# Patient Record
Sex: MALE | Race: WHITE | NOT HISPANIC OR LATINO | URBAN - METROPOLITAN AREA
[De-identification: names, ages, dates, MRNs, and addresses within clinical notes are randomized per-mention and may not be internally consistent; named-entity substitution may affect disease eponyms.]

---

## 2017-06-08 ENCOUNTER — OFFICE VISIT - HEALTHEAST (OUTPATIENT)
Dept: INTERNAL MEDICINE | Facility: CLINIC | Age: 67
End: 2017-06-08

## 2017-06-08 DIAGNOSIS — Z00.00 ROUTINE GENERAL MEDICAL EXAMINATION AT A HEALTH CARE FACILITY: ICD-10-CM

## 2017-06-08 DIAGNOSIS — R42 VERTIGO: ICD-10-CM

## 2017-06-08 DIAGNOSIS — I73.00 RAYNAUD'S PHENOMENON WITHOUT GANGRENE: ICD-10-CM

## 2017-06-08 DIAGNOSIS — M25.561 CHRONIC PAIN OF RIGHT KNEE: ICD-10-CM

## 2017-06-08 DIAGNOSIS — Z13.1 ENCOUNTER FOR SCREENING FOR DIABETES MELLITUS: ICD-10-CM

## 2017-06-08 DIAGNOSIS — Z00.00 HEALTHCARE MAINTENANCE: ICD-10-CM

## 2017-06-08 DIAGNOSIS — S46.001A INJURY OF MUSCLE OR TENDON OF ROTATOR CUFF, RIGHT, INITIAL ENCOUNTER: ICD-10-CM

## 2017-06-08 DIAGNOSIS — N52.9 ERECTILE DYSFUNCTION, UNSPECIFIED ERECTILE DYSFUNCTION TYPE: ICD-10-CM

## 2017-06-08 DIAGNOSIS — L57.0 ACTINIC KERATOSES: ICD-10-CM

## 2017-06-08 DIAGNOSIS — Z13.220 ENCOUNTER FOR SCREENING FOR LIPOID DISORDERS: ICD-10-CM

## 2017-06-08 DIAGNOSIS — G89.29 CHRONIC PAIN OF RIGHT KNEE: ICD-10-CM

## 2017-06-08 LAB
CHOLEST SERPL-MCNC: 164 MG/DL
FASTING STATUS PATIENT QL REPORTED: YES
HDLC SERPL-MCNC: 74 MG/DL
LDLC SERPL CALC-MCNC: 83 MG/DL
TRIGL SERPL-MCNC: 37 MG/DL

## 2017-06-08 ASSESSMENT — MIFFLIN-ST. JEOR: SCORE: 1501.08

## 2017-06-09 ENCOUNTER — OFFICE VISIT - HEALTHEAST (OUTPATIENT)
Dept: PHYSICAL THERAPY | Facility: REHABILITATION | Age: 67
End: 2017-06-09

## 2017-06-09 DIAGNOSIS — M75.81 RIGHT ROTATOR CUFF TENDONITIS: ICD-10-CM

## 2017-06-09 DIAGNOSIS — M62.81 GENERALIZED MUSCLE WEAKNESS: ICD-10-CM

## 2017-06-12 ENCOUNTER — RECORDS - HEALTHEAST (OUTPATIENT)
Dept: ADMINISTRATIVE | Facility: OTHER | Age: 67
End: 2017-06-12

## 2017-06-15 ENCOUNTER — OFFICE VISIT - HEALTHEAST (OUTPATIENT)
Dept: PHYSICAL THERAPY | Facility: REHABILITATION | Age: 67
End: 2017-06-15

## 2017-06-15 DIAGNOSIS — M62.81 GENERALIZED MUSCLE WEAKNESS: ICD-10-CM

## 2017-06-15 DIAGNOSIS — M75.81 RIGHT ROTATOR CUFF TENDONITIS: ICD-10-CM

## 2017-06-29 ENCOUNTER — OFFICE VISIT - HEALTHEAST (OUTPATIENT)
Dept: PHYSICAL THERAPY | Facility: REHABILITATION | Age: 67
End: 2017-06-29

## 2017-06-29 DIAGNOSIS — M62.81 GENERALIZED MUSCLE WEAKNESS: ICD-10-CM

## 2017-06-29 DIAGNOSIS — M75.81 RIGHT ROTATOR CUFF TENDONITIS: ICD-10-CM

## 2017-07-20 ENCOUNTER — OFFICE VISIT - HEALTHEAST (OUTPATIENT)
Dept: PHYSICAL THERAPY | Facility: REHABILITATION | Age: 67
End: 2017-07-20

## 2017-07-20 DIAGNOSIS — M62.81 GENERALIZED MUSCLE WEAKNESS: ICD-10-CM

## 2017-07-20 DIAGNOSIS — M75.81 RIGHT ROTATOR CUFF TENDONITIS: ICD-10-CM

## 2017-07-27 ENCOUNTER — OFFICE VISIT - HEALTHEAST (OUTPATIENT)
Dept: PHYSICAL THERAPY | Facility: REHABILITATION | Age: 67
End: 2017-07-27

## 2017-07-27 DIAGNOSIS — M62.81 GENERALIZED MUSCLE WEAKNESS: ICD-10-CM

## 2017-07-27 DIAGNOSIS — M75.81 RIGHT ROTATOR CUFF TENDONITIS: ICD-10-CM

## 2017-09-06 ENCOUNTER — COMMUNICATION - HEALTHEAST (OUTPATIENT)
Dept: INTERNAL MEDICINE | Facility: CLINIC | Age: 67
End: 2017-09-06

## 2017-11-08 ENCOUNTER — AMBULATORY - HEALTHEAST (OUTPATIENT)
Dept: NURSING | Facility: CLINIC | Age: 67
End: 2017-11-08

## 2017-11-08 DIAGNOSIS — Z00.00 HEALTHCARE MAINTENANCE: ICD-10-CM

## 2018-06-28 ENCOUNTER — OFFICE VISIT - HEALTHEAST (OUTPATIENT)
Dept: INTERNAL MEDICINE | Facility: CLINIC | Age: 68
End: 2018-06-28

## 2018-06-28 DIAGNOSIS — Z13.220 ENCOUNTER FOR SCREENING FOR LIPOID DISORDERS: ICD-10-CM

## 2018-06-28 DIAGNOSIS — Z00.00 ROUTINE GENERAL MEDICAL EXAMINATION AT A HEALTH CARE FACILITY: ICD-10-CM

## 2018-06-28 DIAGNOSIS — Z12.11 SCREEN FOR COLON CANCER: ICD-10-CM

## 2018-06-28 DIAGNOSIS — Z12.5 SCREENING FOR MALIGNANT NEOPLASM OF PROSTATE: ICD-10-CM

## 2018-06-28 DIAGNOSIS — Z13.1 ENCOUNTER FOR SCREENING FOR DIABETES MELLITUS: ICD-10-CM

## 2018-06-28 DIAGNOSIS — N52.01 ERECTILE DYSFUNCTION DUE TO ARTERIAL INSUFFICIENCY: ICD-10-CM

## 2018-06-28 LAB
CHOLEST SERPL-MCNC: 157 MG/DL
FASTING STATUS PATIENT QL REPORTED: YES
FASTING STATUS PATIENT QL REPORTED: YES
GLUCOSE BLD-MCNC: 98 MG/DL (ref 70–99)
HDLC SERPL-MCNC: 71 MG/DL
LDLC SERPL CALC-MCNC: 78 MG/DL
PSA SERPL-MCNC: 0.7 NG/ML (ref 0–4.5)
TRIGL SERPL-MCNC: 38 MG/DL

## 2018-06-28 ASSESSMENT — MIFFLIN-ST. JEOR: SCORE: 1498.36

## 2018-06-29 ENCOUNTER — COMMUNICATION - HEALTHEAST (OUTPATIENT)
Dept: INTERNAL MEDICINE | Facility: CLINIC | Age: 68
End: 2018-06-29

## 2018-07-27 ENCOUNTER — RECORDS - HEALTHEAST (OUTPATIENT)
Dept: ADMINISTRATIVE | Facility: OTHER | Age: 68
End: 2018-07-27

## 2018-08-09 ENCOUNTER — RECORDS - HEALTHEAST (OUTPATIENT)
Dept: ADMINISTRATIVE | Facility: OTHER | Age: 68
End: 2018-08-09

## 2018-12-09 ENCOUNTER — COMMUNICATION - HEALTHEAST (OUTPATIENT)
Dept: INTERNAL MEDICINE | Facility: CLINIC | Age: 68
End: 2018-12-09

## 2019-08-15 ENCOUNTER — OFFICE VISIT - HEALTHEAST (OUTPATIENT)
Dept: INTERNAL MEDICINE | Facility: CLINIC | Age: 69
End: 2019-08-15

## 2019-08-15 DIAGNOSIS — Z12.5 SCREENING FOR MALIGNANT NEOPLASM OF PROSTATE: ICD-10-CM

## 2019-08-15 DIAGNOSIS — Z00.00 ROUTINE GENERAL MEDICAL EXAMINATION AT A HEALTH CARE FACILITY: ICD-10-CM

## 2019-08-15 DIAGNOSIS — Z13.220 ENCOUNTER FOR SCREENING FOR LIPOID DISORDERS: ICD-10-CM

## 2019-08-15 DIAGNOSIS — L57.0 AK (ACTINIC KERATOSIS): ICD-10-CM

## 2019-08-15 DIAGNOSIS — B35.1 ONYCHOMYCOSIS: ICD-10-CM

## 2019-08-15 DIAGNOSIS — Z79.899 MEDICATION MANAGEMENT: ICD-10-CM

## 2019-08-15 LAB
ALBUMIN SERPL-MCNC: 4.2 G/DL (ref 3.5–5)
ALP SERPL-CCNC: 55 U/L (ref 45–120)
ALT SERPL W P-5'-P-CCNC: 18 U/L (ref 0–45)
ANION GAP SERPL CALCULATED.3IONS-SCNC: 9 MMOL/L (ref 5–18)
AST SERPL W P-5'-P-CCNC: 21 U/L (ref 0–40)
BILIRUB SERPL-MCNC: 1.3 MG/DL (ref 0–1)
BUN SERPL-MCNC: 13 MG/DL (ref 8–22)
CALCIUM SERPL-MCNC: 9.8 MG/DL (ref 8.5–10.5)
CHLORIDE BLD-SCNC: 107 MMOL/L (ref 98–107)
CHOLEST SERPL-MCNC: 176 MG/DL
CO2 SERPL-SCNC: 25 MMOL/L (ref 22–31)
CREAT SERPL-MCNC: 0.79 MG/DL (ref 0.7–1.3)
ERYTHROCYTE [DISTWIDTH] IN BLOOD BY AUTOMATED COUNT: 11 % (ref 11–14.5)
FASTING STATUS PATIENT QL REPORTED: YES
GFR SERPL CREATININE-BSD FRML MDRD: >60 ML/MIN/1.73M2
GLUCOSE BLD-MCNC: 89 MG/DL (ref 70–125)
HCT VFR BLD AUTO: 45 % (ref 40–54)
HDLC SERPL-MCNC: 79 MG/DL
HGB BLD-MCNC: 15.3 G/DL (ref 14–18)
LDLC SERPL CALC-MCNC: 89 MG/DL
MCH RBC QN AUTO: 32.9 PG (ref 27–34)
MCHC RBC AUTO-ENTMCNC: 34 G/DL (ref 32–36)
MCV RBC AUTO: 97 FL (ref 80–100)
PLATELET # BLD AUTO: 192 THOU/UL (ref 140–440)
PMV BLD AUTO: 7.3 FL (ref 7–10)
POTASSIUM BLD-SCNC: 3.9 MMOL/L (ref 3.5–5)
PROT SERPL-MCNC: 6.6 G/DL (ref 6–8)
PSA SERPL-MCNC: 0.7 NG/ML (ref 0–4.5)
RBC # BLD AUTO: 4.66 MILL/UL (ref 4.4–6.2)
SODIUM SERPL-SCNC: 141 MMOL/L (ref 136–145)
TRIGL SERPL-MCNC: 41 MG/DL
WBC: 4.3 THOU/UL (ref 4–11)

## 2019-08-15 ASSESSMENT — MIFFLIN-ST. JEOR: SCORE: 1501.08

## 2019-08-16 ENCOUNTER — COMMUNICATION - HEALTHEAST (OUTPATIENT)
Dept: INTERNAL MEDICINE | Facility: CLINIC | Age: 69
End: 2019-08-16

## 2019-09-05 ENCOUNTER — RECORDS - HEALTHEAST (OUTPATIENT)
Dept: ADMINISTRATIVE | Facility: OTHER | Age: 69
End: 2019-09-05

## 2019-11-15 ENCOUNTER — AMBULATORY - HEALTHEAST (OUTPATIENT)
Dept: NURSING | Facility: CLINIC | Age: 69
End: 2019-11-15

## 2019-11-15 DIAGNOSIS — Z23 FLU VACCINE NEED: ICD-10-CM

## 2020-01-07 ENCOUNTER — RECORDS - HEALTHEAST (OUTPATIENT)
Dept: ADMINISTRATIVE | Facility: OTHER | Age: 70
End: 2020-01-07

## 2020-03-12 ENCOUNTER — COMMUNICATION - HEALTHEAST (OUTPATIENT)
Dept: INTERNAL MEDICINE | Facility: CLINIC | Age: 70
End: 2020-03-12

## 2020-03-12 DIAGNOSIS — N52.01 ERECTILE DYSFUNCTION DUE TO ARTERIAL INSUFFICIENCY: ICD-10-CM

## 2020-09-02 ENCOUNTER — OFFICE VISIT - HEALTHEAST (OUTPATIENT)
Dept: INTERNAL MEDICINE | Facility: CLINIC | Age: 70
End: 2020-09-02

## 2020-09-02 DIAGNOSIS — N52.01 ERECTILE DYSFUNCTION DUE TO ARTERIAL INSUFFICIENCY: ICD-10-CM

## 2020-09-02 DIAGNOSIS — R03.0 ELEVATED BLOOD PRESSURE READING WITHOUT DIAGNOSIS OF HYPERTENSION: ICD-10-CM

## 2020-09-02 DIAGNOSIS — G89.29 CHRONIC BILATERAL LOW BACK PAIN WITH LEFT-SIDED SCIATICA: ICD-10-CM

## 2020-09-02 DIAGNOSIS — Z00.00 ROUTINE GENERAL MEDICAL EXAMINATION AT A HEALTH CARE FACILITY: ICD-10-CM

## 2020-09-02 DIAGNOSIS — Z23 NEED FOR PROPHYLACTIC VACCINATION AND INOCULATION AGAINST INFLUENZA: ICD-10-CM

## 2020-09-02 DIAGNOSIS — R35.1 NOCTURIA: ICD-10-CM

## 2020-09-02 DIAGNOSIS — Z12.5 PROSTATE CANCER SCREENING: ICD-10-CM

## 2020-09-02 DIAGNOSIS — M54.42 CHRONIC BILATERAL LOW BACK PAIN WITH LEFT-SIDED SCIATICA: ICD-10-CM

## 2020-09-02 LAB
ALBUMIN SERPL-MCNC: 4.1 G/DL (ref 3.5–5)
ALP SERPL-CCNC: 47 U/L (ref 45–120)
ALT SERPL W P-5'-P-CCNC: 18 U/L (ref 0–45)
AST SERPL W P-5'-P-CCNC: 21 U/L (ref 0–40)
BILIRUB DIRECT SERPL-MCNC: 0.5 MG/DL
BILIRUB SERPL-MCNC: 1.3 MG/DL (ref 0–1)
PROT SERPL-MCNC: 6.4 G/DL (ref 6–8)
PSA SERPL-MCNC: 0.8 NG/ML (ref 0–4.5)

## 2020-09-02 RX ORDER — IBUPROFEN 200 MG
200 TABLET ORAL EVERY 6 HOURS PRN
Status: SHIPPED | COMMUNITY
Start: 2020-09-02

## 2020-09-02 RX ORDER — SILDENAFIL 100 MG/1
100 TABLET, FILM COATED ORAL DAILY PRN
Qty: 5 TABLET | Refills: 4 | Status: SHIPPED | OUTPATIENT
Start: 2020-09-02

## 2020-09-02 ASSESSMENT — MIFFLIN-ST. JEOR: SCORE: 1482.93

## 2020-09-03 ENCOUNTER — COMMUNICATION - HEALTHEAST (OUTPATIENT)
Dept: INTERNAL MEDICINE | Facility: CLINIC | Age: 70
End: 2020-09-03

## 2020-10-05 ENCOUNTER — RECORDS - HEALTHEAST (OUTPATIENT)
Dept: ADMINISTRATIVE | Facility: OTHER | Age: 70
End: 2020-10-05

## 2021-05-31 VITALS — WEIGHT: 162 LBS | HEIGHT: 70 IN | BODY MASS INDEX: 23.19 KG/M2

## 2021-05-31 NOTE — PROGRESS NOTES
Assessment and Plan:       1. Routine general medical examination at a health care facility  Overall, Moises is very healthy.  I recommended incorporating resistance training to his exercise regimen.  He does some floor exercises every day.  He has had a healthy weight, but he wants to lose the waistline.  I talked to him about right lateral epicondylitis and right olecranon pain, both improving, both can be safely observed.    2. Encounter for screening for lipoid disorders  Check fasting blood work  - Lipid Cascade, FASTING    3. Screening for malignant neoplasm of prostate  Check annual blood screen for prostate cancer  - PSA (Prostatic-Specific Antigen), Annual Screen    4. Medication management  He is interested in using Lamisil for onychomycosis of the right great toe, we should get a baseline hemogram and CMP.  - HM2(CBC w/o Differential)  - Comprehensive Metabolic Panel    5. Onychomycosis  Plan for 12-week treatment.  I did tell him that clipping the nail and sending into the lab is standard care, but he is comfortable with just trying the medication first.  - terbinafine HCl (LAMISIL) 250 mg tablet; Take 1 tablet (250 mg total) by mouth daily.  Dispense: 84 tablet; Refill: 0    6. AK (actinic keratosis)  He has follow-up with his dermatologist.  He has one red pimple-like lesion on the tip of his nose.  Otherwise, no actinic keratoses found.  He has a history of using 5-FU on the forehead.        The patient's current medical problems were reviewed.    The following health maintenance schedule was reviewed with the patient and provided in printed form in the after visit summary:   Health Maintenance   Topic Date Due     HEPATITIS C SCREENING  1950     MEDICARE ANNUAL WELLNESS VISIT  06/28/2019     INFLUENZA VACCINE RULE BASED (1) 08/01/2019     FALL RISK ASSESSMENT  08/15/2020     ADVANCE CARE PLANNING  06/28/2023     TD 18+ HE  09/05/2027     COLONOSCOPY  08/09/2028     PNEUMOCOCCAL POLYSACCHARIDE  VACCINE AGE 65 AND OVER  Completed     PNEUMOCOCCAL CONJUGATE VACCINE FOR ADULTS (PCV13 OR PREVNAR)  Completed     ZOSTER VACCINES  Completed        Subjective:   Chief Complaint: Moises Quintana is an 68 y.o. male here for an Annual Wellness visit.   HPI:      He has a mild right elbow issue when using a drill/screwdriver.  It happened 2 months ago, usually bothered when arm extended and squeezes his fist.      The left elbow when pushed down on the matress caused a sharp pain at the corner of the elbow.  It is still occaisonally tender with pressure applied to the area.  No pain or numbness down the arm.      He has some calluses on the left foot.      He follows with the dermatologist.     He had a colonoscopy last year-- good for 10 years.      Review of Systems:    Please see above.  The rest of the review of systems are negative for all systems.    Patient Care Team:  Mir Fernandez DO as PCP - General (Internal Medicine)  Ze Parsons MD as Physician (Dermatology)     Patient Active Problem List   Diagnosis     ED (erectile dysfunction)     Chronic pain of right knee     Raynaud's phenomenon without gangrene     AK (actinic keratosis)     No past medical history on file.   No past surgical history on file.   Family History   Problem Relation Age of Onset     Dementia Mother      Diabetes Mother      Heart disease Father      Cancer Brother         Pancreatic      Social History     Socioeconomic History     Marital status:      Spouse name: Not on file     Number of children: Not on file     Years of education: Not on file     Highest education level: Not on file   Occupational History     Not on file   Social Needs     Financial resource strain: Not on file     Food insecurity:     Worry: Not on file     Inability: Not on file     Transportation needs:     Medical: Not on file     Non-medical: Not on file   Tobacco Use     Smoking status: Never Smoker     Smokeless tobacco: Never Used  "  Substance and Sexual Activity     Alcohol use: Yes     Alcohol/week: 4.0 oz     Types: 8 Standard drinks or equivalent per week     Comment: Ellis, wine, beer     Drug use: Not on file     Sexual activity: Not on file   Lifestyle     Physical activity:     Days per week: Not on file     Minutes per session: Not on file     Stress: Not on file   Relationships     Social connections:     Talks on phone: Not on file     Gets together: Not on file     Attends Judaism service: Not on file     Active member of club or organization: Not on file     Attends meetings of clubs or organizations: Not on file     Relationship status: Not on file     Intimate partner violence:     Fear of current or ex partner: Not on file     Emotionally abused: Not on file     Physically abused: Not on file     Forced sexual activity: Not on file   Other Topics Concern     Not on file   Social History Narrative     Not on file      Current Outpatient Medications   Medication Sig Dispense Refill     sildenafil (VIAGRA) 100 MG tablet Take 1 tablet (100 mg total) by mouth daily as needed for erectile dysfunction. 15 tablet 5     terbinafine HCl (LAMISIL) 250 mg tablet Take 1 tablet (250 mg total) by mouth daily. 84 tablet 0     No current facility-administered medications for this visit.       Objective:   Vital Signs:   Visit Vitals  /64 (Patient Site: Left Arm, Patient Position: Sitting, Cuff Size: Adult Regular)   Pulse 60   Ht 5' 10\" (1.778 m)   Wt 162 lb (73.5 kg)   SpO2 98%   BMI 23.24 kg/m         VisionScreening:  No exam data present     PHYSICAL EXAM    General: alert, no distress  HEENT: sclerae anicteric, moist oral mucosa  Heart: Regular rate and rhythm, no murmurs.  No pretibial edema.  Warm extremities  Lungs: Clear to auscultation bilat  Gastrointestinal: abdomen is soft, non-tender, non-distended.    Skin: warm/dry, no rashes, red papule on the right side of the tip of the nose  Neuro: no gross " abnormalities  Onychomycosis: great toe R      Assessment Results 8/15/2019   Activities of Daily Living No help needed   Instrumental Activities of Daily Living No help needed   Get Up and Go Score Less than 12 seconds   Mini Cog Total Score 5   Some recent data might be hidden     A Mini-Cog score of 0-2 suggests the possibility of dementia, score of 3-5 suggests no dementia    Identified Health Risks:     Patient's advanced directive was discussed and I am comfortable with the patient's wishes.

## 2021-05-31 NOTE — PATIENT INSTRUCTIONS - HE
Advance Directive  Patient s advance directive was discussed and I am comfortable with the patient s wishes.  Patient Education   Personalized Prevention Plan  You are due for the preventive services outlined below.  Your care team is available to assist you in scheduling these services.  If you have already completed any of these items, please share that information with your care team to update in your medical record.  Health Maintenance   Topic Date Due     HEPATITIS C SCREENING  1950     MEDICARE ANNUAL WELLNESS VISIT  06/28/2019     INFLUENZA VACCINE RULE BASED (1) 08/01/2019     FALL RISK ASSESSMENT  08/15/2020     ADVANCE CARE PLANNING  06/28/2023     TD 18+ HE  09/05/2027     COLONOSCOPY  08/09/2028     PNEUMOCOCCAL POLYSACCHARIDE VACCINE AGE 65 AND OVER  Completed     PNEUMOCOCCAL CONJUGATE VACCINE FOR ADULTS (PCV13 OR PREVNAR)  Completed     ZOSTER VACCINES  Completed

## 2021-06-01 VITALS — BODY MASS INDEX: 23.11 KG/M2 | WEIGHT: 161.4 LBS | HEIGHT: 70 IN

## 2021-06-03 VITALS — WEIGHT: 162 LBS | HEIGHT: 70 IN | BODY MASS INDEX: 23.19 KG/M2

## 2021-06-04 VITALS
HEIGHT: 70 IN | HEART RATE: 61 BPM | OXYGEN SATURATION: 98 % | DIASTOLIC BLOOD PRESSURE: 71 MMHG | BODY MASS INDEX: 22.62 KG/M2 | WEIGHT: 158 LBS | SYSTOLIC BLOOD PRESSURE: 138 MMHG

## 2021-06-06 NOTE — TELEPHONE ENCOUNTER
Refill Approved    Rx renewed per Medication Renewal Policy. Medication was last renewed on 6/28/18.    Eleanor Schaefer, [facility] Triage/Med Refill 3/15/2020     Requested Prescriptions   Pending Prescriptions Disp Refills     sildenafiL (VIAGRA) 100 MG tablet [Pharmacy Med Name: SILDENAFIL 100 MG TABLET] 5 tablet 17     Sig: TAKE 1 TABLET (100 MG TOTAL) BY MOUTH DAILY AS NEEDED FOR ERECTILE DYSFUNCTION.       Medications for Impotence Refill Protocol Passed - 3/12/2020  5:47 PM        Passed - PCP or prescribing provider visit in last year     Last office visit with prescriber/PCP: Visit date not found OR same dept: Visit date not found OR same specialty: Visit date not found  Last physical: 8/15/2019 Last MTM visit: Visit date not found   Next visit within 3 mo: Visit date not found  Next physical within 3 mo: Visit date not found  Prescriber OR PCP: Mir Fernandez DO  Last diagnosis associated with med order: 1. Erectile dysfunction due to arterial insufficiency  - sildenafiL (VIAGRA) 100 MG tablet [Pharmacy Med Name: SILDENAFIL 100 MG TABLET]; Take 1 tablet (100 mg total) by mouth daily as needed for erectile dysfunction.  Dispense: 5 tablet; Refill: 17    If protocol passes may refill for 12 months if within 3 months of last provider visit (or a total of 15 months).

## 2021-06-11 NOTE — PROGRESS NOTES
Assessment and Plan:       1. Healthcare maintenance  Moises Quintana is a 66-year-old man who presents for initial annual wellness visit.  We will update fasting labs namely glucose and cholesterol.  Administer PCV vaccination.  Recently retired, should continue exercise as he is.  Overall, he is very healthy.  - Pneumococcal polysaccharide vaccine 23-valent 1 yo or older, subq/IM  - Colonoscopy due next year    2. Routine general medical examination at a health care facility  See above    3. Encounter for screening for diabetes mellitus   Glucose; Future  - Glucose    4. Encounter for screening for lipoid disorders    - Lipid Grover, FASTING; Future  - Lipid Grover, FASTING    5. Actinic keratoses  Previously had actinic keratosis treated on the nose, has multiple lesions on the forehead, what appears to be seborrheic keratosis on the left forearm, but is somewhat dark, I recommended dermatology referral, because I think the cryotherapy would be fairly extensive, and he should have a full skin exam.  - Ambulatory referral to Dermatology    6. Injury of muscle or tendon of rotator cuff, right, initial encounter  He describes trouble throwing a baseball, exam is fairly benign, but some signs of rotator cuff tendinitis.  I recommended x-ray, physical therapy.  I did advise him that it may be an additional charge.  Noted some degeneration of AC joint and x-ray.  - Ambulatory referral to Physical Therapy  - XR Shoulder Right 2 or More VWS    7. Chronic pain of right knee  Intermittently, he will of a sharp pain in the knee, intermittently has swelling.  Rest arthritis, meniscus injury are on the differential.  He does not want to pursue further evaluation or treatment until it gets worse.    8. 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  I advised him to contact his insurance to see what will be  covered.  The letter he showed me did not indicate which one was on the formulary or would be supported.    9. Raynaud's phenomenon without gangrene  Cold fingers and tingling in the wintertime, as present the diagnosis.  Advised to come back in the winter if that returns, so we can get better history.  He did not does endorse some blanching of the fingers, which makes me think it is this.    10. Vertigo  A few months ago, he would have vertigo first thing in the morning when turning over in bed or sitting up, describing a spinning sensation.  Likely a benign positional vertigo.  Also advised to come back if this returns so we can further work this up.         The patient's current medical problems were reviewed.      The following health maintenance schedule was reviewed with the patient and provided in printed form in the after visit summary:   Health Maintenance   Topic Date Due     ADVANCE DIRECTIVES DISCUSSED WITH PATIENT  11/10/1968     ZOSTER VACCINE  11/10/2010     PNEUMOCOCCAL POLYSACCHARIDE VACCINE AGE 65 AND OVER  11/10/2015     COLONOSCOPY  03/15/2018     INFLUENZA VACCINE RULE BASED (Season Ended) 08/01/2017     FALL RISK ASSESSMENT  06/08/2018     TD 18+ HE  10/22/2020     PNEUMOCOCCAL CONJUGATE VACCINE FOR ADULTS (PCV13 OR PREVNAR)  Completed        Subjective:   Chief Complaint: Moises Quintana is an 66 y.o. male here for an Annual Wellness visit.     HPI:    Moises Quintana is a 66-year-old man who presents for annual wellness visit.  He describes his health as good.  He has retired 1 month ago from [employer], he was a, still there.  He is doing housework, but he is concerned that he does not have enough hobbies to keep him busy in the long run.  He continues to have some pain in his knee intermittently, such as when he was in [place] visiting his daughter, after walking 10 miles, he had sharp pain when starting to climb the stairs.  Occasionally it swells, but goes away quickly.  He uses a brace as  needed when he skis, but has not done this recently.  Since he has stopped working he has noticed a decrease in his bowel movements.    He has noticed some decrease in the range of motion and strength in his right arm when he throws a ball overhead.  He has pain along the course of the upper deltoid/supraspinatus when he does this or reaches back to throw a ball.    He received a notice from [employer] that Cialis is no longer on the formulary.  He wonders what the alternative option is.    He noticed a small skin lesion on his left forearm.    Over the winter for about 1-2 months, he had short-lived vertigo upon standing or rolling over in his bed, but is now resolved.  He would usually have this only in the morning  And each time resolved very quickly.    In the winter he has noticed cold and tingling in his hands, some paleness when he feels mildly colds.  He wonders what this could be.    He is getting exercise 4-5 times per week, walking regularly, doing floor exercises.    Review of Systems:  Please see above.  The rest of the review of systems are negative for all systems.    Patient Care Team:  Mir Fernandez DO as PCP - General (Internal Medicine)     Patient Active Problem List   Diagnosis     ED (erectile dysfunction)     Chronic pain of right knee     Raynaud's phenomenon without gangrene     No past medical history on file.   No past surgical history on file.   Family History   Problem Relation Age of Onset     Dementia Mother      Diabetes Mother      Heart disease Father      Cancer Brother      Pancreatic      Social History     Social History     Marital status:      Spouse name: N/A     Number of children: N/A     Years of education: N/A     Occupational History     Not on file.     Social History Main Topics     Smoking status: Never Smoker     Smokeless tobacco: Not on file     Alcohol use 4.0 oz/week     8 Standard drinks or equivalent per week      Comment: Weber, wine, beer  "    Drug use: Not on file     Sexual activity: Not on file     Other Topics Concern     Not on file     Social History Narrative      Current Outpatient Prescriptions   Medication Sig Dispense Refill     tadalafil (CIALIS) 10 MG tablet Take 1 tablet (10 mg total) by mouth daily as needed for erectile dysfunction. 20 tablet 1     No current facility-administered medications for this visit.       Objective:   Vital Signs:   Visit Vitals     /66     Pulse (!) 52     Ht 5' 10\" (1.778 m)     Wt 162 lb (73.5 kg)     SpO2 98%     BMI 23.24 kg/m2        VisionScreening:   Visual Acuity Screening    Right eye Left eye Both eyes   Without correction:      With correction: 10/10 10/10 10/10        PHYSICAL EXAM    General: alert, no distress  HEENT: sclerae anicteric, moist oral mucosa  Heart: Regular rate and rhythm, no murmurs.  No pretibial edema.  Warm extremities  Lungs: Clear to auscultation bilat  Gastrointestinal: abdomen is soft, non-tender, non-distended.    Skin: warm/dry, no rashes  Neuro: no gross abnormalities  Musculoskeletal: Painful arc, pain with external range of motion and internal range of motion.  Full strength in rotator cuff muscles.  Negative signs of impingement.       Assessment Results 6/8/2017   Activities of Daily Living No help needed   Instrumental Activities of Daily Living No help needed   Get Up and Go Score Less than 12 seconds   Mini Cog Total Score 5     A Mini-Cog score of 0-2 suggests the possibility of dementia, score of 3-5 suggests no dementia    X-ray of the shoulder shows degenerative change of the AC joint, otherwise normal.    Identified Health Risks:     The patient reports that he drinks more than one alcoholic drink per day but denies binge or excessive drinking. He was counseled and given information about possible harmful effects of excessive alcohol intake.  Patient's advanced directive was discussed and I am comfortable with the patient's wishes.        "

## 2021-06-11 NOTE — PROGRESS NOTES
[facility] Daily Progress     Patient Name: Moises Quintana  Date: 6/15/2017  Visit #: 2  PTA visit #:  na  Referral Diagnosis: Injury of muscle or tendon of rotator cuff, right, initial encounter [S46.001A]   Referring provider: Mir Fernandez DO  Visit Diagnosis:     ICD-10-CM    1. Right rotator cuff tendonitis M75.81    2. Generalized muscle weakness M62.81          Assessment:     HEP/POC compliance is  good .  Response to Intervention Min cueing with HEP. Spent time going over pt's own exercises he completes at home per pt request.  Patient is appropriate to continue with skilled physical therapy intervention, as indicated by initial plan of care.    Goal Status: on-going  Pt. will be independent with home exercise program in : 4 weeks (to self-manage pain and improve function)  Pt will: be able to throw a ball 5-10 times for playing with grandkids pain-free in 8 weeks.    Plan / Patient Education:     Continue with initial plan of care.  Progress with home program as tolerated. trial throwing if appropriate    Subjective:     Pain Ratin R shoulder     Exercises are going well. R shoulder has not had any pain but has not thrown a ball at all. Is doing other exercises in the morning and would like to review those too.    Objective:     Min cueing with HEP    Minimal pain with cocking phase of throwing     Treatment Today     TREATMENT MINUTES COMMENTS   Evaluation     Self-care/ Home management     Manual therapy     Neuromuscular Re-education     Therapeutic Activity     Therapeutic Exercises 30 -see exercise flow sheet  -UBE x4 min F/B, WL 3.0  Current HEP: performed 5-10 reps of each:  SLR  Bicycle  rachel leg ext  Hip abd S/L  Hip add S/L  Hip ext prone  Prone ext   Gait training     Modality__________________                Total 30    Blank areas are intentional and mean the treatment did not include these items.       Georgina Rivas, PT, DPT  6/15/2017

## 2021-06-11 NOTE — PROGRESS NOTES
[facility]   Shoulder Initial Evaluation    Patient Name: Moises Suarez  Date of evaluation: 6/9/2017  Referral Diagnosis: Injury of muscle or tendon of rotator cuff, right, initial encounter [S46.001A]   Referring provider: Mir Fernandez DO  Visit Diagnosis:     ICD-10-CM    1. Right rotator cuff tendonitis M75.81    2. Generalized muscle weakness M62.81        Assessment:      Impairments in  pain, posture, joint mobility, strength, motor function  Patient's signs and symptoms are consistent with right rotator cuff tendonitis. Pt presents with pain while overhead throwing in his right lateral shoulder. Today, he has negative R shoulder special tests, impaired seated posture, R glenohumeral posterior capsule tightness and weakness into R shoulder ER and abduction..  The POC is dynamic and will be modified on an ongoing basis.  Barriers to achieving goals as noted in the assessment section may affect outcome.  Prognosis to achieve goals is  good   Skilled PT is required to reduce pain and be able to return to throwing a ball.  Pt. is appropriate for skilled PT intervention as outlined in the Plan of Care (POC).  Pt. is a good candidate for skilled PT services to improve pain levels and function.      Goals:  Pt. will be independent with home exercise program in : 4 weeks (to self-manage pain and improve function)  Pt will: be able to throw a ball 5-10 times for playing with grandkids pain-free in 8 weeks.    Patient's expectations/goals are realistic.    Barriers to Learning or Achieving Goals:  No Barriers.       Plan / Patient Instructions:        Plan of Care:   Communication with: Referral Source  Patient Related Instruction: Nature of Condition;Treatment plan and rationale;Self Care instruction;Basis of treatment;Body mechanics;Expected outcome;Next steps;Precautions;Posture  Times per Week: 1  Number of Weeks: 8  Number of Visits: 8  Therapeutic Exercise:  ROM;Stretching;Strengthening  Neuromuscular Reeducation: kinesio tape;posture;balance/proprioception;core  Manual Therapy: soft tissue mobilization;myofascial release;joint mobilization;muscle energy;strain counterstrain  Equipment: theraband    POC and pathology of condition were reviewed with patient.  Pt. is in agreement with the Plan of Care  A Home Exercise Program (HEP) was initiated today.  Pt. was instructed in exercises by PT and patient was given a handout with detailed instructions.    Plan for next visit: progress RC strengthening, posterior and inferior mobilizations  .   Subjective:       Social information:   Occupation:retired   Work Status:NA   Equipment Available: None    History of Present Illness:    Moises is a 66 y.o. male who presents to therapy today with complaints of right shoulder pain. Date of onset/duration of symptoms is about 8 months (since Oct 2016). Pt denies any injury. Onset was gradual. Symptoms are not improving. He can avoid the pain if he stops throwing.  He denies history of similar symptoms. He describes their previous level of function as not limited. The pain only lasts when he is throwing the ball. Sometimes then it will hurt later on with reaching.    Pain Ratin  Pain rating at best: 0  Pain rating at worst: 4  Pain description: sharp    Functional limitations are described as occurring with:   Throwing a ball           Objective:      Note: Items left blank indicates the item was not performed or not indicated at the time of the evaluation.    Patient Outcome Measures :    Shoulder Pain and Disability Index (SPADI) in %: 4   Scores range from 0-100%, where a score of 0% represents minimal pain and maximal function. The minimal clincically important difference is a score reduction of 10%.    Shoulder Examination  1. Right rotator cuff tendonitis     2. Generalized muscle weakness       Precautions/Restrictions: None  Involved side: Right  Posture Observation:       General sitting posture is  fair. Mild forward shoulders and head  Cervical Clearing: Normal    Shoulder/Elbow ROM  =  Date: 6/9/2017     Shoulder and Elbow ROM ( )   AROM in degrees AROM in degrees AROM in degrees    Right Left Right Left Right Left   Shoulder Flexion (0-180 ) WNL WNL       Shoulder Abduction (0-180 ) WNL WNL       Shoulder Extension (0-60 )         Shoulder ER (0-90 ) WNL WNL       Shoulder IR (0-70 ) WNL WNL       Shoulder IR/Ext         Elbow Flexion (150 )         Elbow Extension (0 )          PROM in degrees PROM in degrees PROM in degrees    Right Left Right Left Right Left   Shoulder Flexion (0-180 )         Shoulder Abduction (0-180 )         Shoulder Extension (0-60 )         Shoulder ER (0-90 )         Shoulder IR (0-70 )         Elbow Flexion (150 )         Elbow Extension (0 )           Shoulder/Elbow Strength   Date: 6/9/2017     Shoulder/Elbow Strength (/5)  Manual Muscle Test (MMT) MMT MMT MMT    Right Left Right Left Right Left   Shoulder Flexion 5 5       Supraspinatus         Shoulder Abduction 4+ 5       Shoulder Extension 5 5       Shoulder External Rotation 4 4       Shoulder Internal Rotation 5 5       Elbow Flexion 5 5       Elbow Extension 5 5       Other:         Other:             Palpation: not tender at any RC tendon or upper trapezius on R    Joint Assessment:  Glenohumeral Joint Assessment:Posterior Capsule tightness.    Shoulder Special Tests     Impingement Cluster Right (+/-) Left (+/-) Rotator Cuff Tests Right (+/-) Left (+/-)   Villanueva-Nabeel -  Drop Arm Sign     Painful Arc -  Hornblowers     Infraspinatus Test +  ERLS     AC Tests Right (+/-) Left (+/-) IRLS     Active Compression   Labral Tests Right (+/-) Left (+/-)   Crossbody Adduction   Biceps Load Test II     AC Resisted Extension   Jerk Test     GH Instability Tests Right (+/-) Left (+/-) Jo Ann Test     Sulcus Sign   Biceps Tests Right (+/-) Left (+/-)   Apprehension   Speed -    Relocation   Shanice stiles      Other:   Other:     Other:   Other:         Treatment Today    TREATMENT MINUTES COMMENTS   Evaluation 20 -R shoulder   Self-care/ Home management     Manual therapy     Neuromuscular Re-education     Therapeutic Activity     Therapeutic Exercises 25 -see exercise flow sheet  -educated on POC, diagnosis and HEP  -educated on use of therabands- pt has question about muscle-length-tension relationship between bands; discussed how exercise is completed through the middle ROM to avoid the end range resistance of the band  -educated on completing HEP daily   Gait training     Modality__________________                Total 45    Blank areas are intentional and mean the treatment did not include these items.     PT Evaluation Code: (Please list factors)  Patient History/Comorbidities: none  Examination: right shoulder  Clinical Presentation: stable  Clinical Decision Making: low    Patient History/  Comorbidities Examination  (body structures and functions, activity limitations, and/or participation restrictions) Clinical Presentation Clinical Decision Making (Complexity)   No documented Comorbidities or personal factors 1-2 Elements Stable and/or uncomplicated Low   1-2 documented comorbidities or personal factor 3 Elements Evolving clinical presentation with changing characteristics Moderate   3-4 documented comorbidities or personal factors 4 or more Unstable and unpredictable High                Georgina Rivas, PT, DPT  6/9/2017  1:00 PM

## 2021-06-11 NOTE — PROGRESS NOTES
Assessment:    Healthy male exam.    Plan:   1. Routine general medical examination at a health care facility  No jaundice or pruritus.  Status post completion of course of terbinafine for onychomycosis.  - Hepatic Profile    2. Need for prophylactic vaccination and inoculation against influenza  - Influenza,Quad,High Dose,PF 65 YR+    3. Prostate cancer screening  - PSA, Annual Screen (Prostatic-Specific Antigen)    4. Erectile dysfunction due to arterial insufficiency  Refill request  - sildenafiL (VIAGRA) 100 MG tablet; Take 1 tablet (100 mg total) by mouth daily as needed for erectile dysfunction.  Dispense: 5 tablet; Refill: 4    5. Chronic bilateral low back pain with left-sided sciatica  Likely has degenerative disease in the lumbar spine.  Absence of sciatica/radiculopathy, bowel/bladder incontinence, lower extremity weakness, spinous process tenderness/pain are all reassuring.  Provided patient with series of exercises to strengthen his lower back    6. Nocturia  Counseled to avoid caffeine intake after midday, consider scheduled voiding and emptying his bladder prior to bedtime.  Counseled on the mechanism of action of tamsulosin, which she wanted to avoid for now.  Has no history of diabetes.    7. Elevated blood pressure reading without diagnosis of hypertension  Reviewed patient systolic blood pressures with him for the last 2 years.  Provided him with counseling to cut down his salt and caffeine intake.    Subjective:    Moises Quintana is a 69 y.o. male who presents for an annual exam. The patient reports that there is not domestic violence in his life.  Patient of Dr. Fernandez.  History pertinent for erectile dysfunction raynaud's phenomenon without gangrene, degenerative disease int he right AC joint, actinic keratosis.  CMP, PSA, FLP, CBC from August 2019 unremarkable.Colonoscopy August 2018 noted to have 1 polyp with recommendation for follow-up in 10 years. Does have right knee OA in the setting  flag football injury in early 20s.  Also endorses some lower back pain, which has been more noticeable in the last week but has not resolved. He does not remember hurting his back, but suspect he might have lifted something in the past. Feels stiff getting up in the AM, and after doing his strengthening exercises, which help. He denies any hip or shoulder issues bilaterally. No loss of control of bladder or bladder function. No back surgeries or injuries. Denies leg weakness or shooting pain from back down to the legs. Does have limited ROM in the right knee. Does have mild to moderate DDD int he cervical spine based on MR cervical in 2011. Has nocturia X 1.  Did finish terbinafine since last LFT check for onychomycosis.    Healthy Habits:   Regular Exercise: Yes  Sunscreen Use: Yes  Healthy Diet: Yes  Dental Visits Regularly: Yes  Seat Belt: Yes  Sexually active: Yes  Monthly Self Testicular Exams:  Yes  Hemoccults: No  Flex Sig: No  Colonoscopy: Yes - Last in 2018 -due in 10 years  Lipid Profile: Yes  Glucose Screen: Yes  Prevention of Osteoporosis: No  Last Dexa: N/A  Guns at Home:  No    Immunization History   Administered Date(s) Administered     Influenza [id number], 02/10/2010, 10/15/2010     Influenza high dose,seasonal,PF, 65+ yrs 11/08/2017, 11/15/2019     Influenza, inj, historic,unspecified 11/16/1995, 09/20/2012, 01/06/2015, 08/14/2018     Influenza,quad,high Dose,PF, 65yr + 09/02/2020     Influenza,seasonal, Inj IIV3 01/26/2005     Pneumo Conj 13-V (2010&after) 06/07/2016     Pneumo Polysac 23-V 06/08/2017     Tdap 10/22/2010, 09/05/2017     ZOSTER, LIVE 03/28/2016, 12/07/2018     ZOSTER, RECOMBINANT, IM 08/14/2018, 12/07/2018     Immunization status: up to date and documented.    No exam data present     Current Outpatient Medications   Medication Sig Dispense Refill     ibuprofen (ADVIL,MOTRIN) 200 MG tablet Take 200 mg by mouth every 6 (six) hours as needed for pain.       sildenafiL (VIAGRA) 100 MG  "tablet Take 1 tablet (100 mg total) by mouth daily as needed for erectile dysfunction. 5 tablet 4     No current facility-administered medications for this visit.      No past medical history on file.  No past surgical history on file.  Patient has no known allergies.  Family History   Problem Relation Age of Onset     Dementia Mother      Diabetes Mother      Heart disease Father      Cancer Brother         Pancreatic     Socioeconomic History     Marital status:      Smoking status: Never Smoker     Smokeless tobacco: Never Used   Substance and Sexual Activity     Alcohol use: Yes     Alcohol/week: 6.7 standard drinks     Types: 8 Standard drinks or equivalent per week     Comment: [place], wine, beer    , wife Ellen. 2 children. Retired      ROS A comprehensive review of systems was performed and was otherwise negative    Objective:     Vitals:    09/02/20 1137   BP: 138/71   Pulse: 61   SpO2: 98%   Weight: 158 lb (71.7 kg)   Height: 5' 10\" (1.778 m)     Body mass index is 22.67 kg/m .    Physical  GENERAL APPEARANCE: Pleasant, nontoxic-appearing, no acute distress   EYES: Eyelids, conjunctiva, and sclera were normal. PERRLA.    HEAD, EARS, NOSE, MOUTH, AND THROAT: Head and face were normal. Hearing was normal to voice   NECK: Neck appearance was normal.   RESPIRATORY: Bilaterally with no crackles, wheezing or rhonchi  CARDIOVASCULAR: Regular S1 and S2.  Radial pulses intact.  No edema.  GASTROINTESTINAL: NABS. Soft. No organ enlargement or tenderness.  No tenderness, mass, or enlarged organs.   MUSCULOSKELETAL: Skeletal configuration was normal and muscle mass was normal for age.  Hypertrophy of bone at the knee joint, but no edema, hypertrophy or warmth/tenderness  SKIN/HAIR/NAILS: Skin color was normal.  There were no skin lesions in the neck, back or upper arms. Hair and nails were normal.  NEUROLOGIC: Alert and oriented x4. Speech was normal. Cranial nerves were normal. Motor strength " was normal for age.  No focal deficits  PSYCHIATRIC:  Mood and affect were normal and the patient had normal recent and remote memory. The patient's judgment and insight were normal.

## 2021-06-11 NOTE — PROGRESS NOTES
"[facility] Daily Progress     Patient Name: Moises Suarez  Date: 2017  Visit #: 3  PTA visit #:  na  Referral Diagnosis: Injury of muscle or tendon of rotator cuff, right, initial encounter [S46.001A]   Referring provider: Mir Fernandez DO  Visit Diagnosis:     ICD-10-CM    1. Right rotator cuff tendonitis M75.81    2. Generalized muscle weakness M62.81          Assessment:     HEP/POC compliance is  fair .  Patient demonstrates understanding/independence with home program.  Response to Intervention Decreased \"pulling\" with throwing after MT. Progressed HEP to start to throw 5-10 times a day.  Patient is appropriate to continue with skilled physical therapy intervention, as indicated by initial plan of care.    Goal Status: on-going  Pt. will be independent with home exercise program in : 4 weeks (to self-manage pain and improve function)  Pt will: be able to throw a ball 5-10 times for playing with grandkids pain-free in 8 weeks.    Plan / Patient Education:     Continue with initial plan of care.  Progress with home program as tolerated. progress throwing with weighted ball and continue ER strengthening    Subjective:     Pain Ratin R shoulder   Limited compliance with HEP due to being on vacation. Has not pushed his shoulder so it is not painful.    Objective:     Min cueing with HEP    Minimal pain with cocking phase of throwing that decreased after MT    Treatment Today     TREATMENT MINUTES COMMENTS   Evaluation     Self-care/ Home management     Manual therapy 10 - R GH distraction and posterior glides, grade III-IV in supine   Neuromuscular Re-education     Therapeutic Activity     Therapeutic Exercises 20 -see exercise flow sheet  -UBE x4 min F/B, WL 2.5   Gait training     Modality__________________                Total 30    Blank areas are intentional and mean the treatment did not include these items.       Georgina Rivas, PT, DPT  2017  "

## 2021-06-12 NOTE — PROGRESS NOTES
[facility] Daily Progress     Patient Name: Moises Suarez  Date: 2017  Visit #: 4  PTA visit #:  na  Referral Diagnosis: Injury of muscle or tendon of rotator cuff, right, initial encounter [S46.001A]   Referring provider: Mir Fernandez DO  Visit Diagnosis:     ICD-10-CM    1. Right rotator cuff tendonitis M75.81    2. Generalized muscle weakness M62.81          Assessment:     HEP/POC compliance is  fair .  Patient demonstrates understanding/independence with home program.  Response to Intervention Good tolerance to progression of throwing with weighted ball today without (R) shoulder pain.   Patient is appropriate to continue with skilled physical therapy intervention, as indicated by initial plan of care.    Goal Status: on-going  Pt. will be independent with home exercise program in : 4 weeks (to self-manage pain and improve function)  Pt will: be able to throw a ball 5-10 times for playing with [employer] pain-free in 8 weeks.    Plan / Patient Education:     Continue with initial plan of care.  Progress with home program as tolerated. progress throwing with weighted ball and continue ER strengthening    Subjective:     Pain Ratin R shoulder   Pt reports his shoulder has been feeling better. Very minimal pain with throwing. Hasn't been pushing it, but with light throwing, doesn't not have any pain. Pt reports limtied compliance with his HEP because he went on another vacation.     Objective:   No pain in cocking phase with throwing 1# or 2# ball  Added wall dribbles at 90-90 with light ball for HEP    Treatment Today     TREATMENT MINUTES COMMENTS   Evaluation     Self-care/ Home management     Manual therapy 8 - R GH distraction and posterior glides, grade III-IV in supine   Neuromuscular Re-education     Therapeutic Activity     Therapeutic Exercises 17 -see exercise flow sheet  -UBE x4 min F/B, WL 2.5   Gait training     Modality__________________                Total 25     Blank areas are intentional and mean the treatment did not include these items.       Georgina Vital, PT, DPT  7/20/2017

## 2021-06-12 NOTE — PROGRESS NOTES
[facility] Daily Progress / Discharge Summary    Patient Name: Moises Quintana  Date: 2017  Visit #: 5  PTA visit #:  na  Referral Diagnosis: Injury of muscle or tendon of rotator cuff, right, initial encounter [S46.001A]   Referring provider: Mir Fernandez DO  Visit Diagnosis:     ICD-10-CM    1. Right rotator cuff tendonitis M75.81    2. Generalized muscle weakness M62.81          Assessment:   HEP/POC compliance is  good .  Response to Intervention Pt has met all PT goals and demonstrated improvements in R UE RC strength.  Patient has benefitted from skilled physical therapy and is making steady progress toward functional goals.    Goal Status:  Pt. will be independent with home exercise program in : 4 weeks;Met (to self-manage pain and improve function)  Pt will: be able to throw a ball 5-10 times for recreational activities and pain-free in 8 weeks. (MET)    Plan / Patient Education:     Initial plan of care has been completed. Patient has responded appropriately to skilled PT intervention.  The patient met goals and has demonstrated understanding of/independence in the home program for self-care and progression to next steps.  The patient will initiate contact if questions or concerns arise.    Subjective:     Pain Ratin  Pt has returned to throwing and does not have any pain. No concerns at this point.        Objective:     Shoulder/Elbow Strength   Date: 2017    Shoulder/Elbow Strength (/5)  Manual Muscle Test (MMT) MMT MMT MMT    Right Left Right Left Right Left   Shoulder Flexion 5 5 5 5     Supraspinatus         Shoulder Abduction 4+ 5 5 5     Shoulder Extension 5 5       Shoulder External Rotation 4 4 5 4+     Shoulder Internal Rotation 5 5       Elbow Flexion 5 5       Elbow Extension 5 5       Other:         Other:           rachel shoulder AROM WNL rachel and pain-free    Neg Sonja, neg Neer, neg infraspinatus testing on R    Treatment Today  TREATMENT  MINUTES COMMENTS   Evaluation     Self-care/ Home management     Manual therapy     Neuromuscular Re-education     Therapeutic Activity     Therapeutic Exercises 18 -see exercise flow sheet  -educated on discharge- will leave chart open for 1 month, then will need new order to return to PT  -educated on continued HEP compliance to maintain strength and posture  -educated on correct posture in his car    Gait training     Modality__________________                Total 18    Blank areas are intentional and mean the treatment did not include these items.     Georgina Rivas, PT, DPT  7/27/2017

## 2021-06-16 PROBLEM — I73.00 RAYNAUD'S PHENOMENON WITHOUT GANGRENE: Status: ACTIVE | Noted: 2017-06-08

## 2021-06-16 PROBLEM — G89.29 CHRONIC PAIN OF RIGHT KNEE: Status: ACTIVE | Noted: 2017-06-08

## 2021-06-16 PROBLEM — M25.561 CHRONIC PAIN OF RIGHT KNEE: Status: ACTIVE | Noted: 2017-06-08

## 2021-06-16 PROBLEM — L57.0 AK (ACTINIC KERATOSIS): Status: ACTIVE | Noted: 2018-06-28

## 2021-06-18 NOTE — PATIENT INSTRUCTIONS - HE
Patient Instructions by Foreign Spangler MD at 9/2/2020 11:40 AM     Author: Foreign Spangler MD Service: -- Author Type: Physician    Filed: 9/2/2020 12:40 PM Encounter Date: 9/2/2020 Status: Addendum    : Foreign Spangler MD (Physician)    Related Notes: Original Note by Foreign Spangler MD (Physician) filed at 9/2/2020 12:36 PM       Patient Education     Established High Blood Pressure    High blood pressure (hypertension) is a chronic disease. Often, healthcare providers dont know what causes it. But it can be caused by certain health conditions and medicines.  If you have high blood pressure, you may not have any symptoms. If you do have symptoms, they may include headache, dizziness, changes in your vision, chest pain, and shortness of breath. But even without symptoms, high blood pressure thats not treated raises your risk for heart attack, heart failure, and stroke. High blood pressure is a serious health risk and shouldnt be ignored.  Blood pressure measurements are given as 2 numbers. Systolic blood pressure is the upper number. This is the pressure when the heart contracts. Diastolic blood pressure is the lower number. This is the pressure when the heart relaxes between beats. You will see your blood pressure readings written together. For example, a person with a systolic pressure of 118 and a diastolic pressure of 78 will have 118/78 written in the medical record.  Blood pressure is categorized as normal, elevated, or stage 1 or stage 2 high blood pressure:    Normal blood pressure is systolic of less than 120 and diastolic of less than 80 (120/80)    Elevated blood pressure is systolic of 120 to 129 and diastolic less than 80    Stage 1 high blood pressure is systolic is 130 to 139 or diastolic between 80 to 89    Stage 2 high blood pressure is when systolic is 140 or higher or the diastolic is 90 or higher  Home care  If you have high blood pressure,  follow these home care guidelines to help lower your blood pressure. If you are taking medicines for high blood pressure, these methods may reduce or end your need for medicines in the future.    Cut back on how much salt you get in your diet. Heres how to do this:  ? Dont eat foods that have a lot of salt. These include olives, pickles, smoked meats, and salted potato chips.  ? Dont add salt to your food at the table.  ? Use only small amounts of salt when cooking.    Limit how much caffeine you get in your diet. Switch to caffeine-free products.    Learn how to handle stress. This is an important part of any program to lower blood pressure. Learn about relaxation methods like meditation, yoga, or biofeedback.    If your provider prescribed medicines, take them exactly as directed. Missing doses may cause your blood pressure get out of control.    If you miss a dose or doses, check with your healthcare provider or pharmacist about what to do.    Consider buying an automatic blood pressure machine to check your blood pressure at home. Ask your provider for a recommendation. You can get one of these at most pharmacies.    The American Heart Association recommends the following guidelines for home blood pressure monitoring:    Don't smoke or drink coffee for 30 minutes before taking your blood pressure.    Go to the bathroom before the test.    Relax for 5 minutes before taking the measurement.    Sit with your back supported (don't sit on a couch or soft chair); keep your feet on the floor uncrossed. Place your arm on a solid flat surface (like a table) with the upper part of the arm at heart level. Place the middle of the cuff directly above the bend of the elbow. Check the monitor's instruction manual for an illustration.    Take multiple readings. When you measure, take 2 to 3 readings one minute apart and record all of the results.    Take your blood pressure at the same time every day, or as your healthcare  provider recommends.    Record the date, time, and blood pressure reading.    Take the record with you to your next medical appointment. If your blood pressure monitor has a built-in memory, simply take the monitor with you to your next appointment.    Call your provider if you have several high readings. Don't be frightened by a single high blood pressure reading, but if you get several high readings, check in with your healthcare provider.    Note: When blood pressure reaches a systolic (top number) of 180 or higher OR diastolic (bottom number) of 110 or higher, seek emergency medical treatment.  Follow-up care  You will need to see your healthcare provider regularly. This is to check your blood pressure and to make changes to your medicines. Make a follow-up appointment as directed. Bring the record of your home blood pressure readings to the appointment.  When to seek medical advice  Call your healthcare provider right away if any of these occur:    Blood pressure reaches a systolic (upper number) of 180 or higher OR a diastolic (bottom number) of 110 or higher    Chest pain or shortness of breath    Severe headache    Throbbing or rushing sound in the ears    Nosebleed    Sudden severe pain in your belly (abdomen)    Extreme drowsiness, confusion, or fainting    Dizziness or spinning sensation (vertigo)    Weakness of an arm or leg or one side of the face    You have problems speaking or seeing     Patient Education     Exercises to Strengthen Your Lower Back  Strong lower back and abdominal muscles work together to support your spine. The exercises below will help strengthen the lower back. It is important that you begin exercising slowly and increase levels gradually.  Always begin any exercise program with stretching. If you feel pain while doing any of these exercises, stop and talk to your doctor about a more specific exercise program that better suits your condition.   Low back stretch  The point of  stretching is to make you more flexible and increase your range of motion. Stretch only as much as you are able. Stretch slowly. Do not push your stretch to the limit. If at any point you feel pain while stretching, this is your (temporary) limit.    Lie on your back with your knees bent and both feet on the ground.    Slowly raise your left knee to your chest as you flatten your lower back against the floor. Hold for 5 seconds.    Relax and repeat the exercise with your right knee.    Do 10 of these exercises for each leg.    Repeat hugging both knees to your chest at the same time.  Building lower back strength  Start your exercise routine with 10 to 30 minutes a day, 1 to 3 times a day.  Initial exercises  Lying on your back:  1. Ankle pumps: Move your foot up and down, towards your head, and then away. Repeat 10 times with each foot.  2. Heel slides: Slowly bend your knee, drawing the heel of your foot towards you. Then slide your heel/foot from you, straightening your knee. Do not lift your foot off the floor (this is not a leg lift).  3. Abdominal contraction: Bend your knees and put your hands on your stomach. Tighten your stomach muscles. Hold for 5 seconds, then relax. Repeat 10 times.  4. Straight leg raise: Bend one leg at the knee and keep the other leg straight. Tighten your stomach muscles. Slowly lift your straight leg 6 to 12 inches off the floor and hold for up to 5 seconds. Repeat 10 times on each side.  Standin. Wall squats: Stand with your back against the wall. Move your feet about 12 inches away from the wall. Tighten your stomach muscles, and slowly bend your knees until they are at about a 45 degree angle. Do not go down too far. Hold about 5 seconds. Then slowly return to your starting position. Repeat 10 times.  2. Heel raises: Stand facing the wall. Slowly raise the heels of your feet up and down, while keeping your toes on the floor. If you have trouble balancing, you can touch the  wall with your hands. Repeat 10 times.  More advanced exercises  When you feel comfortable enough, try these exercises.  1. Kneeling lumbar extension: Begin on your hands and knees. At the same time, raise and straighten your right arm and left leg until they are parallel to the ground. Hold for 2 seconds and come back slowly to a starting position. Repeat with left arm and right leg, alternating 10 times.  2. Prone lumbar extension: Lie face down, arms extended overhead, palms on the floor. At the same time, raise your right arm and left leg as high as comfortably possible. Hold for 10 seconds and slowly return to start. Repeat with left arm and right leg, alternating 10 times. Gradually build up to 20 times. (Advanced: Repeat this exercise raising both arms and both legs a few inches off the floor at the same time. Hold for 5 seconds and release.)  3. Pelvic tilt: Lie on the floor on your back with your knees bent at 90 degrees. Your feet should be flat on the floor. Inhale, exhale, then slowly contract your abdominal muscles bringing your navel toward your spine. Let your pelvis rock back until your lower back is flat on the floor. Hold for 10 seconds while breathing smoothly.  4. Abdominal crunch: Perform a pelvic tilt (above) flattening your lower back against the floor. Holding the tension in your abdominal muscles, take another breath and raise your shoulder blades off the ground (this is not a full sit-up). Keep your head in line with your body (dont bend your neck forward). Hold for 2 seconds, then slowly lower.

## 2021-06-18 NOTE — PROGRESS NOTES
Assessment and Plan:       1. Routine general medical examination at a health care facility  Moises Quintana is a 67-year-old man here for annual wellness visit.  Overall he is doing very well.  He has a knee that occasionally bothers him.  Blood pressure well controlled, weight is within normal range.  He has a healthy lifestyle.  He is enjoying [facility].    2. Screen for colon cancer  - Ambulatory referral for Colonoscopy    3. Encounter for screening for lipoid disorders  - Lipid Cascade, FASTING    4. Erectile dysfunction due to arterial insufficiency  He would like to switch from Cialis back to Viagra.  - sildenafil (VIAGRA) 100 MG tablet; Take 1 tablet (100 mg total) by mouth daily as needed for erectile dysfunction.  Dispense: 15 tablet; Refill: 5    5. Encounter for screening for diabetes mellitus  Glucose mildly elevated at last visit  - Glucose    6. Screening for malignant neoplasm of prostate  We discussed prostate cancer screening, decided to pursue PSA testing.  He asked about rectal exam, explained that while it is something we can do, does not add much to the cancer screening.  - PSA (Prostatic-Specific Antigen), Annual Screen     The patient's current medical problems were reviewed.    I have had an Advance Directives discussion with the patient.  The following health maintenance schedule was reviewed with the patient and provided in printed form in the after visit summary:   Health Maintenance   Topic Date Due     COLONOSCOPY  03/15/2018     FALL RISK ASSESSMENT  06/28/2019     ADVANCE DIRECTIVES DISCUSSED WITH PATIENT  06/08/2022     TD 18+ HE  09/05/2027     PNEUMOCOCCAL POLYSACCHARIDE VACCINE AGE 65 AND OVER  Completed     INFLUENZA VACCINE RULE BASED  Completed     PNEUMOCOCCAL CONJUGATE VACCINE FOR ADULTS (PCV13 OR PREVNAR)  Completed     ZOSTER VACCINE  Completed        Subjective:   Chief Complaint: Moises Quintana is an 67 y.o. male here for an Annual Wellness visit.   HPI:    Moises  has been feeling well, he has no complaints.  When he runs, his right knee tends to bother him.  He does not want to do any additional workup on this now.    He and his wife walked the dog, a he does strengthening exercises in the morning.  He does not have any chest pain or shortness of breath.    At the last visit, he had actinic keratosis on the nose which I had previously treated, he had multiple lesions on the forehead, refer to dermatology consultants.  He saw them about a year ago, he was treated with 5-fluorouracil on the scalp/forehead, also had a couple lesions removed from the right leg which were benign.    He is due for colonoscopy, he would like to set that up.    He would like to change from Cialis back to Viagra because Viagra was more effective.    He is retired from [employer], he did drug research.    Review of Systems:    Please see above.  The rest of the review of systems are negative for all systems.    Patient Care Team:  Mir Fernandez DO as PCP - General (Internal Medicine)  Ze Parsons MD as Physician (Dermatology)     Patient Active Problem List   Diagnosis     ED (erectile dysfunction)     Chronic pain of right knee     Raynaud's phenomenon without gangrene     History reviewed. No pertinent past medical history.   No past surgical history on file.   Family History   Problem Relation Age of Onset     Dementia Mother      Diabetes Mother      Heart disease Father      Cancer Brother      Pancreatic      Social History     Social History     Marital status:      Spouse name: N/A     Number of children: N/A     Years of education: N/A     Occupational History     Not on file.     Social History Main Topics     Smoking status: Never Smoker     Smokeless tobacco: Never Used     Alcohol use 4.0 oz/week     8 Standard drinks or equivalent per week      Comment: [place], wine, beer     Drug use: Not on file     Sexual activity: Not on file     Other Topics Concern     Not on file      Social History Narrative      Current Outpatient Prescriptions   Medication Sig Dispense Refill     tadalafil (CIALIS) 10 MG tablet Take 1 tablet (10 mg total) by mouth daily as needed for erectile dysfunction. 20 tablet 1     No current facility-administered medications for this visit.       Objective:   Vital Signs:   Visit Vitals     /66 (Patient Site: Left Arm, Patient Position: Sitting, Cuff Size: Adult Regular)     Pulse (!) 58     Resp 16     Wt 161 lb 6.4 oz (73.2 kg)     BMI 23.16 kg/m2        VisionScreening:  No exam data present     PHYSICAL EXAM  General: alert, no distress  HEENT: sclerae anicteric, moist oral mucosa  Heart: Regular rate and rhythm, no murmurs.  No pretibial edema.  Warm extremities  Lungs: Clear to auscultation bilat  Gastrointestinal: abdomen is soft, non-tender, non-distended.    Skin: warm/dry, no rashes  Neuro: no gross abnormalities         Assessment Results 6/28/2018   Activities of Daily Living No help needed   Instrumental Activities of Daily Living No help needed   Get Up and Go Score Less than 12 seconds   Mini Cog Total Score 5   Some recent data might be hidden     A Mini-Cog score of 0-2 suggests the possibility of dementia, score of 3-5 suggests no dementia    Identified Health Risks:     Patient's advanced directive was discussed and I am comfortable with the patient's wishes.

## 2021-09-12 ENCOUNTER — HEALTH MAINTENANCE LETTER (OUTPATIENT)
Age: 71
End: 2021-09-12

## 2021-10-08 ENCOUNTER — TRANSFERRED RECORDS (OUTPATIENT)
Dept: HEALTH INFORMATION MANAGEMENT | Facility: CLINIC | Age: 71
End: 2021-10-08

## 2021-11-07 ENCOUNTER — HEALTH MAINTENANCE LETTER (OUTPATIENT)
Age: 71
End: 2021-11-07

## 2022-08-25 ENCOUNTER — TRANSFERRED RECORDS (OUTPATIENT)
Dept: HEALTH INFORMATION MANAGEMENT | Facility: CLINIC | Age: 72
End: 2022-08-25

## 2022-11-19 ENCOUNTER — HEALTH MAINTENANCE LETTER (OUTPATIENT)
Age: 72
End: 2022-11-19

## 2023-10-11 ENCOUNTER — TRANSFERRED RECORDS (OUTPATIENT)
Dept: HEALTH INFORMATION MANAGEMENT | Facility: CLINIC | Age: 73
End: 2023-10-11

## 2024-01-27 ENCOUNTER — HEALTH MAINTENANCE LETTER (OUTPATIENT)
Age: 74
End: 2024-01-27

## 2024-10-14 ENCOUNTER — TRANSFERRED RECORDS (OUTPATIENT)
Dept: HEALTH INFORMATION MANAGEMENT | Facility: CLINIC | Age: 74
End: 2024-10-14